# Patient Record
Sex: MALE | Race: WHITE | URBAN - METROPOLITAN AREA
[De-identification: names, ages, dates, MRNs, and addresses within clinical notes are randomized per-mention and may not be internally consistent; named-entity substitution may affect disease eponyms.]

---
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
## 2021-07-06 ENCOUNTER — NON-APPOINTMENT (OUTPATIENT)
Age: 21
End: 2021-07-06

## 2021-07-06 PROBLEM — Z00.00 ENCOUNTER FOR PREVENTIVE HEALTH EXAMINATION: Status: ACTIVE | Noted: 2021-07-06

## 2021-07-23 ENCOUNTER — APPOINTMENT (OUTPATIENT)
Dept: UROLOGY | Facility: CLINIC | Age: 21
End: 2021-07-23

## 2021-08-08 ENCOUNTER — EMERGENCY (EMERGENCY)
Facility: HOSPITAL | Age: 21
LOS: 0 days | Discharge: HOME | End: 2021-08-08
Attending: EMERGENCY MEDICINE | Admitting: EMERGENCY MEDICINE
Payer: COMMERCIAL

## 2021-08-08 VITALS
SYSTOLIC BLOOD PRESSURE: 140 MMHG | RESPIRATION RATE: 18 BRPM | HEART RATE: 74 BPM | OXYGEN SATURATION: 100 % | TEMPERATURE: 97 F | DIASTOLIC BLOOD PRESSURE: 80 MMHG

## 2021-08-08 VITALS
HEART RATE: 86 BPM | SYSTOLIC BLOOD PRESSURE: 123 MMHG | OXYGEN SATURATION: 98 % | TEMPERATURE: 97 F | DIASTOLIC BLOOD PRESSURE: 66 MMHG | RESPIRATION RATE: 18 BRPM

## 2021-08-08 DIAGNOSIS — N48.30 PRIAPISM, UNSPECIFIED: ICD-10-CM

## 2021-08-08 LAB — SARS-COV-2 RNA SPEC QL NAA+PROBE: SIGNIFICANT CHANGE UP

## 2021-08-08 PROCEDURE — 99284 EMERGENCY DEPT VISIT MOD MDM: CPT | Mod: 25

## 2021-08-08 PROCEDURE — 54220 IRRG CRPRA CAVRNOSA PRIAPISM: CPT

## 2021-08-08 RX ORDER — PHENYLEPHRINE HYDROCHLORIDE 10 MG/ML
50 INJECTION INTRAVENOUS ONCE
Refills: 0 | Status: COMPLETED | OUTPATIENT
Start: 2021-08-08 | End: 2021-08-08

## 2021-08-08 RX ORDER — MORPHINE SULFATE 50 MG/1
4 CAPSULE, EXTENDED RELEASE ORAL ONCE
Refills: 0 | Status: DISCONTINUED | OUTPATIENT
Start: 2021-08-08 | End: 2021-08-08

## 2021-08-08 RX ORDER — PHENYLEPHRINE HYDROCHLORIDE 10 MG/ML
3 INJECTION INTRAVENOUS ONCE
Refills: 0 | Status: DISCONTINUED | OUTPATIENT
Start: 2021-08-08 | End: 2021-08-08

## 2021-08-08 RX ORDER — PHENYLEPHRINE HYDROCHLORIDE 10 MG/ML
0.05 INJECTION INTRAVENOUS ONCE
Refills: 0 | Status: DISCONTINUED | OUTPATIENT
Start: 2021-08-08 | End: 2021-08-08

## 2021-08-08 RX ORDER — SODIUM CHLORIDE 9 MG/ML
1000 INJECTION, SOLUTION INTRAVENOUS ONCE
Refills: 0 | Status: COMPLETED | OUTPATIENT
Start: 2021-08-08 | End: 2021-08-08

## 2021-08-08 RX ADMIN — MORPHINE SULFATE 4 MILLIGRAM(S): 50 CAPSULE, EXTENDED RELEASE ORAL at 12:11

## 2021-08-08 RX ADMIN — Medication 0.5 MILLIGRAM(S): at 11:40

## 2021-08-08 RX ADMIN — SODIUM CHLORIDE 1000 MILLILITER(S): 9 INJECTION, SOLUTION INTRAVENOUS at 14:38

## 2021-08-08 RX ADMIN — PHENYLEPHRINE HYDROCHLORIDE 50 MICROGRAM(S): 10 INJECTION INTRAVENOUS at 13:39

## 2021-08-08 NOTE — ED PROVIDER NOTE - CARE PROVIDERS DIRECT ADDRESSES
[email or web address]

## 2021-08-08 NOTE — ED PROVIDER NOTE - NS ED ROS FT
Cardiac:  No chest pain, SOB or edema. No chest pain with exertion.  Respiratory:  No cough or respiratory distress. No hemoptysis. No history of asthma or RAD.  GI:  No nausea, vomiting, diarrhea or abdominal pain.  :  + priapism No dysuria, frequency or burning.  MS:  No myalgia, muscle weakness, joint pain or back pain.  Neuro:  No headache or weakness.  No LOC.  Skin:  No skin rash.   Endocrine: No history of thyroid disease or diabetes.  Except as documented in the HPI,  all other systems are negative.

## 2021-08-08 NOTE — ED PROVIDER NOTE - PROGRESS NOTE DETAILS
pt with priapism, terbutaline given subcutaneously with no relief. 25 CC ASPIRATED with minimal relief. 1 dose of Phenylephrine administered with no resolution, will re-administer complete resolution after second dose of phenyleprine, will dc with urology f/u

## 2021-08-08 NOTE — ED PROVIDER NOTE - CARE PROVIDER_API CALL
Lux Cortez)  Urology  [address] [place] [address], [address]  [place] [address]  Phone: [phone]  Fax: [phone]  Follow Up Time:

## 2021-08-08 NOTE — ED PROVIDER NOTE - ATTENDING CONTRIBUTION TO CARE
pt with low flow priapism eventually detumesced after aspiration 25cc dark blood and injection of 150mcg total phenylephrine (after sc terbutaline unsuccessful), pt comfortable and voiding freely, will d/c to continue heme and  workup. Patient counseled regarding conditions which should prompt return.

## 2021-08-08 NOTE — ED ADULT NURSE NOTE - CHPI ED NUR SYMPTOMS POS
priapism since 8am, states this event also occurred june 30th of this year. pain woke him from sleep today at 8:00am

## 2021-08-08 NOTE — PHARMACY COMMUNICATION NOTE - COMMENTS
as per dr Camp wants 50mcg dose for priapism
dr Camp wants terbutaline 0.5mg SQ for priapism as first line

## 2021-08-08 NOTE — ED PROVIDER NOTE - OBJECTIVE STATEMENT
Pt is  a 22y/o male with a pmhx of Recurrent Priapism of unknown etiology presents today for priapism x 3 hours with no preceding factors. Pt sts similar episode occurred a few months ago lasting for 8 hours and requiring drainage. Pt denies abd pain, fever, chills, CP, SOB, Cocaine use.

## 2021-08-08 NOTE — CONSULT NOTE ADULT - SUBJECTIVE AND OBJECTIVE BOX
23M w/no PMHx presents to ED with complaints of painful erection.  patient reports he took mushrooms last night and this am when he awoke he had an erection.  He reports the erection continued to get more firm as time passed and it became painful.  He came to the ED for evaluation.  Patient reports 2 previous episodes of this that happened over the past 2 years.  The first time it resolved spontaneously the second it was drained in the ED.  The patient also reports that each morning this week he awoke with an erection that lasted 1 hour and resolved  He is currently following with Heme, Dr. Ortez and Urology, Dr Lou in [place].  No CP/SOB.  No fever/.chills.  No abdominal complaints.        PAST MEDICAL & SURGICAL HISTORY:  No pertinent past medical history    No significant past surgical history        MEDICATIONS  (STANDING):  None      Allergies    No Known Allergies    Intolerances        Physical Exam:  General: appears anxious  Abdominal: soft, ND/NT  : Erect penis noted.  No overlying skin changes  Extremities:  no clubbing/cyanosis/edema  Neuro: A/O x 3    Vital Signs Last 24 Hrs  T(C): 36.1 (08 Aug 2021 11:06), Max: 36.3 (08 Aug 2021 10:48)  T(F): 97 (08 Aug 2021 11:06), Max: 97.3 (08 Aug 2021 10:48)  HR: 54 (08 Aug 2021 11:06) (54 - 74)  BP: 155/86 (08 Aug 2021 11:06) (140/80 - 155/86)  BP(mean): --  RR: 18 (08 Aug 2021 11:06) (18 - 18)  SpO2: 100% (08 Aug 2021 11:06) (100% - 100%)    LABS:      None        CAPILLARY BLOOD GLUCOSE            Cultures:      RADIOLOGY & ADDITIONAL STUDIES: None

## 2021-08-08 NOTE — ED PROVIDER NOTE - PHYSICAL EXAMINATION
VITAL SIGNS: I have reviewed nursing notes and confirm.  CONSTITUTIONAL: Well-developed; well-nourished; in no acute distress.   SKIN: skin exam is warm and dry, no acute rash.    HEAD: Normocephalic; atraumatic.  EYES:  conjunctiva and sclera clear.  ENT: No nasal discharge; airway clear.  CARD: S1, S2 normal; no murmurs, gallops, or rubs. Regular rate and rhythm.   RESP: No wheezes, rales or rhonchi.  ABD: Normal bowel sounds; soft; non-distended; non-tender  PELVIC: erect penis, no TTP Testicles   EXT: Normal ROM.  No clubbing, cyanosis or edema.   NEURO: Alert, oriented, grossly unremarkable

## 2021-08-08 NOTE — ED ADULT TRIAGE NOTE - CHIEF COMPLAINT QUOTE
c/o priapism since 8 am  Pt took "mushrooms" last night. This has happened to the pt before and has had it drained in the past

## 2021-08-08 NOTE — ED PROVIDER NOTE - NSFOLLOWUPINSTRUCTIONS_ED_ALL_ED_FT
Priapism  Priapism is an unwanted erection of the penis that usually develops without sexual stimulation or desire. Priapism affects males of all ages. There are three types of priapism:  Recurrent acute priapism. With this type, erections are painful and last less than 3 hours. The erections come and go.  Acute prolonged priapism. With this type, erections are painful and last hours to days. This type can lead to erectile dysfunction.  Persistent priapism. With this type, erections are usually painless and can last weeks to years. The penis gets erect but not rigid. This type can lead to erectile dysfunction.  What are the causes?  This condition develops either when blood has difficulty leaving the penis (low-flow priapism) or if too much blood flows into the penis (high-flow priapism). Blood flow issues may be caused by:  Erectile dysfunction medicine. This is the most common cause.  Medicine that is used to treat depression and anxiety.  Blood problems that are common in people who have sickle cell disease or leukemia.  Use of illegal drugs, such as cocaine and marijuana.  Excessive alcohol use.  Neurological problems, such as multiple sclerosis.  Diabetes mellitus.  An injury to the penis.  An infection.  In some cases, the cause may not be known.    What are the signs or symptoms?  Symptoms of this condition include:  A prolonged erection.  A painful erection.  How is this diagnosed?  This condition is diagnosed with a physical exam. Blood tests may be done to help identify the cause of the condition.    How is this treated?  Treatment for this condition depends on the cause and the type of priapism. Recurrent acute priapism is often managed at home. Acute prolonged priapism is usually treated at a hospital, where treatment may involve:  Getting fluid and medicines for pain through an IV tube.  A blood transfusion.  A procedure to drain blood from the penis.  Surgery to make a passageway for blood to flow in the penis (surgical shunting).  No standard treatment exists for persistent priapism.    Follow these instructions at home:  Managing Recurrent Priapism     Try taking a warm bath or exercising.  Keep track of how long your erection lasts. If it does not go away in 3 hours, seek medical care.  General instructions     Avoid sexual stimulation and intercourse until your health care provider says that they are okay for you.  Avoid drugs or alcohol if they caused the priapism. Avoiding them can help to prevent the condition from coming back.  Drink enough fluid to keep your urine clear or pale yellow.  Empty your bladder as much as possible.  Take over-the-counter and prescription medicines only as told by your health care provider.  Do not take any medicines during an episode unless you get approval from your health care provider.  Keep all follow-up visits as told by your health care provider. This is important.  Contact a health care provider if:  Your pain gets worse.  Your pain does not improve with treatment.  You have recurrent priapism and your erection does not go away in 3 hours.  Get help right away if:  You have a fever or chills.  You have pain, swelling, or redness in your genitals or your groin area.

## 2021-08-08 NOTE — ED PROVIDER NOTE - PATIENT PORTAL LINK FT
You can access the FollowMyHealth Patient Portal offered by [facility] by registering at the following website: [email or web address]. By joining [employer]’s FollowMyHealth portal, you will also be able to view your health information using other applications (apps) compatible with our system.

## 2021-08-08 NOTE — CONSULT NOTE ADULT - ASSESSMENT
23M w/complaints of priapism (recurrent)  - recommended intracavernosal injection of phenylephrine +/- aspiration  - recommend continued follow up with Heme and Urology outpatient  - if unsuccessful, please recall Urology for further recommendations  - d/w Urology attending

## 2021-08-28 ENCOUNTER — EMERGENCY (EMERGENCY)
Facility: HOSPITAL | Age: 21
LOS: 0 days | Discharge: HOME | End: 2021-08-28
Attending: EMERGENCY MEDICINE | Admitting: EMERGENCY MEDICINE
Payer: COMMERCIAL

## 2021-08-28 VITALS
TEMPERATURE: 98 F | OXYGEN SATURATION: 98 % | RESPIRATION RATE: 18 BRPM | DIASTOLIC BLOOD PRESSURE: 62 MMHG | SYSTOLIC BLOOD PRESSURE: 123 MMHG | HEART RATE: 96 BPM

## 2021-08-28 VITALS
WEIGHT: 160.06 LBS | HEART RATE: 91 BPM | RESPIRATION RATE: 18 BRPM | SYSTOLIC BLOOD PRESSURE: 125 MMHG | DIASTOLIC BLOOD PRESSURE: 66 MMHG | TEMPERATURE: 98 F | HEIGHT: 66 IN | OXYGEN SATURATION: 97 %

## 2021-08-28 DIAGNOSIS — N48.30 PRIAPISM, UNSPECIFIED: ICD-10-CM

## 2021-08-28 LAB
BASE EXCESS BLDV CALC-SCNC: -19.1 MMOL/L — LOW (ref -2–3)
CA-I SERPL-SCNC: 1.13 MMOL/L — LOW (ref 1.15–1.33)
GAS PNL BLDV: 131 MMOL/L — LOW (ref 136–145)
GAS PNL BLDV: SIGNIFICANT CHANGE UP
HCO3 BLDV-SCNC: 19 MMOL/L — LOW (ref 22–29)
HCT VFR BLDA CALC: 53 % — HIGH (ref 39–51)
HGB BLD CALC-MCNC: 17.7 G/DL — HIGH (ref 12.6–17.4)
LACTATE BLDV-MCNC: 17.6 MMOL/L — CRITICAL HIGH (ref 0.5–2)
PCO2 BLDV: 119 MMHG — HIGH (ref 42–55)
PH BLDV: 6.81 — LOW (ref 7.32–7.43)
PO2 BLDV: 14 MMHG — SIGNIFICANT CHANGE UP
POTASSIUM BLDV-SCNC: 4.6 MMOL/L — SIGNIFICANT CHANGE UP (ref 3.5–5.1)
SAO2 % BLDV: 6.3 % — SIGNIFICANT CHANGE UP

## 2021-08-28 PROCEDURE — 99285 EMERGENCY DEPT VISIT HI MDM: CPT | Mod: 25

## 2021-08-28 PROCEDURE — 54220 IRRG CRPRA CAVRNOSA PRIAPISM: CPT

## 2021-08-28 RX ORDER — MORPHINE SULFATE 50 MG/1
4 CAPSULE, EXTENDED RELEASE ORAL ONCE
Refills: 0 | Status: DISCONTINUED | OUTPATIENT
Start: 2021-08-28 | End: 2021-08-28

## 2021-08-28 RX ORDER — PHENYLEPHRINE HYDROCHLORIDE 10 MG/ML
50 INJECTION INTRAVENOUS ONCE
Refills: 0 | Status: COMPLETED | OUTPATIENT
Start: 2021-08-28 | End: 2021-08-28

## 2021-08-28 RX ORDER — TADALAFIL 10 MG/1
0 TABLET, FILM COATED ORAL
Qty: 0 | Refills: 0 | DISCHARGE

## 2021-08-28 RX ADMIN — Medication 0.5 MILLIGRAM(S): at 14:29

## 2021-08-28 RX ADMIN — MORPHINE SULFATE 4 MILLIGRAM(S): 50 CAPSULE, EXTENDED RELEASE ORAL at 14:00

## 2021-08-28 RX ADMIN — PHENYLEPHRINE HYDROCHLORIDE 50 MICROGRAM(S): 10 INJECTION INTRAVENOUS at 14:29

## 2021-08-28 NOTE — ED PROVIDER NOTE - ATTENDING CONTRIBUTION TO CARE
I was present for and supervised the key and critical aspects of the procedures performed during the care of the patient. patient presents for evaluation of priaprism he has had multiple similar episodes in the past he sees urology as an outpatient takes low dose po cialis.  notes erection over the past several hours presents for worsening pain he denies any testicular pain he denies any fevers or chills   on exam patient has no abdominal pain no pain to palpation of the b/l testicles patient does have priaprism initially given ice and morphine with no relief we consulted urology and performed drainage with phenylephrine which was successful I will discharge at this time

## 2021-08-28 NOTE — ED ADULT TRIAGE NOTE - CHIEF COMPLAINT QUOTE
"I have priaprism. This happens often. It's the third time I'm coming to the hospital for it." Pt advises he was prescribed a low dose Cialis for this condition one week ago by a Urologist in [place].

## 2021-08-28 NOTE — ED PROVIDER NOTE - PATIENT PORTAL LINK FT
You can access the FollowMyHealth Patient Portal offered by [facility] by registering at the following website: [email or web address]. By joining [employer]’s FollowMyHealth portal, you will also be able to view your health information using other applications (apps) compatible with our system.

## 2021-08-28 NOTE — ED PROVIDER NOTE - NSFOLLOWUPINSTRUCTIONS_ED_ALL_ED_FT
Priapism    Priapism is an unwanted erection of the penis that usually develops without sexual stimulation or desire. Priapism affects males of all ages. There are three types of priapism:  •Recurrent acute priapism. With this type, erections are painful and last less than 3 hours. The erections come and go.      •Acute prolonged priapism. With this type, erections are painful and last hours to days. This type can lead to erectile dysfunction.      •Persistent priapism. With this type, erections are usually painless and can last weeks to years. The penis gets erect but not rigid. This type can lead to erectile dysfunction.        What are the causes?  This condition develops either when blood has difficulty leaving the penis (low-flow priapism) or when too much blood flows into the penis (high-flow priapism). Blood flow issues may be caused by:  •Erectile dysfunction medicine. This is the most common cause.      •Medicine that is used to treat depression and anxiety.      •Blood problems that are common in people who have sickle cell disease or leukemia.      •Use of illegal drugs, such as cocaine and crack.      •Use of marijuana.      •Excessive alcohol use.      •Diabetes mellitus.      •Neurological problems, such as multiple sclerosis.      •An injury to the penis.      •An infection.      In some cases, the cause may not be known.      What are the signs or symptoms?  Symptoms of this condition include:  •A prolonged erection in the absence of stimulation.      •A painful erection.        How is this diagnosed?    This condition is diagnosed with a physical exam. Blood tests and imaging tests such as an ultrasound or MRI may be done to help identify the cause of the condition.      How is this treated?  Treatment for this condition depends on the cause and the type of priapism. Recurrent acute priapism is often managed at home. Acute prolonged priapism is usually treated at a hospital, where treatment may involve:  •Getting fluid and medicines for pain through an IV.      •Having a procedure to drain blood from the penis.      •Having surgery to make a passageway for blood to flow in the penis (surgical shunting).      •Having a blood transfusion if you have sickle cell disease.      No standard treatment exists for persistent priapism. Ice packs and cold showers are sometimes used to help relieve symptoms.      Follow these instructions at home:      Managing recurrent priapism                   •Try taking a warm bath or exercising.      •Keep track of how long your erection lasts. If it does not go away in 4 hours, seek medical care.      Medicines     •Take over-the-counter and prescription medicines only as told by your health care provider.      • Do not take any medicines during an episode unless you get approval from your health care provider.        General instructions      •Avoid sexual stimulation and intercourse until your health care provider says that they are okay for you.      •Avoid drugs or alcohol if they caused the priapism. Avoiding them can help to prevent the condition from coming back.      •Drink enough fluid to keep your urine pale yellow.      •Empty your bladder as much as possible.      •Keep all follow-up visits as told by your health care provider. This is important.        Contact a health care provider if:    •Your pain gets worse.      •Your pain does not improve with treatment.      •You have recurrent priapism and your erection does not go away in 4 hours.        Get help right away if:    •You have a fever or chills.      •You have more pain, swelling, or redness in your genitals or your groin area.        Summary    •Priapism is an unwanted erection of the penis that usually develops without sexual stimulation or desire.      •Treatment for this condition depends on the cause and the type of priapism. Recurrent acute priapism is often managed at home. Acute prolonged priapism is usually treated at a hospital.      •Avoid drugs or alcohol if they caused the priapism. This can help to prevent the condition from coming back.      •Take over-the-counter and prescription medicines only as told by your health care provider.      •Contact a health care provider if you have recurrent priapism and your erection does not go away in 4 hours.      This information is not intended to replace advice given to you by your health care provider. Make sure you discuss any questions you have with your health care provider.

## 2021-08-28 NOTE — ED PROVIDER NOTE - OBJECTIVE STATEMENT
21 windy, [facility] of priaprisim, followed by private urologist in [place] on low dose Cialis per uro, presents to ed for painful erection sine 9am this morning, mild, aching, no radiation. denies fever, chills, dysuria, hematuria, testicular pain.

## 2021-08-28 NOTE — CONSULT NOTE ADULT - ASSESSMENT
Patient is a 22yo M with a pmhx of priapsim whom presents to ED with c/o erection since 9am today and began having pain so came to ED. Patient reports 2 prior ER visits for the same problem requiring phenylephrine injection into penis. Patient currently being followed by a urologist in [place] recommenced to have an MRI of spine. Patient prescribed cialis every other day Patient seen by hematology as well. Patient had aspiration of penis and currently receiving phenylephrine injections every 5 minutes. Patient reports sx now improving, pain has resolved.   -c/w phenylephrine injection and once detumescence achieved observe in ED for one hour  -patient should follow up with his private urologist  -d/w DR De Souza

## 2021-08-28 NOTE — ED PROVIDER NOTE - PHYSICAL EXAMINATION
Physical Exam    Vital Signs: I have reviewed the initial vital signs.  Constitutional: well-nourished, appears stated age, no acute distress  Eyes: Conjunctiva pink, Sclera clear  Cardiovascular: S1 and S2, regular rate, regular rhythm, well-perfused extremities, radial pulses equal and 2+  Respiratory: unlabored respiratory effort, clear to auscultation bilaterally no wheezing, rales and rhonchi  Gastrointestinal: soft, non-tender abdomen, no pulsatile mass, normal bowl sounds  : erect penis, no testicular pain, circumcised   Musculoskeletal: supple neck, no lower extremity edema, no midline tenderness  Integumentary: warm, dry, no rash  Neurologic: awake, alert, nvi

## 2021-08-28 NOTE — CONSULT NOTE ADULT - SUBJECTIVE AND OBJECTIVE BOX
HPI:  Patient is a 20yo M with a pmhx of priapsim whom presents to ED with c/o erection since 9am today and began having pain so came to ED. Patient reports 2 prior ER visits for the same problem requiring phenylephrine injection into penis. Patient currently being followed by a urologist in [place] recommenced to have an MRI of spine. Patient prescribed cialis every other day Patient seen by hematology as well.       PAST MEDICAL & SURGICAL HISTORY:  No pertinent past medical history    Priapism    No significant past surgical history        Allergies    No Known Allergies    I      MEDICATIONS  (STANDING):  cialis     ROS:  General:	no fever, weight loss,  chills  Skin: no rash, ulcers  Ophthalmologic: no visual changes  ENMT:	no sore throat  Respiratory and Thorax: no cough, wheeze,  sob  Cardiovascular:	no chest pain, palpitations, dizziness  Gastrointestinal:	no nausea, vomiting, diarrhea, abd pain  Genitourinary:	no dysuria, hematuria  Musculoskeletal:	no joint pains  Neurological:	 no speech disturbance, focal weakness, numbness  Psychiatric:	no depression, anxiety, psychosis  Hematology/Lymphatics:	no anemia  Endocrine:	no polyuria, polydipsia        Vital Signs Last 24 Hrs  T(C): 36.4 (28 Aug 2021 13:43), Max: 36.4 (28 Aug 2021 13:43)  T(F): 97.5 (28 Aug 2021 13:43), Max: 97.5 (28 Aug 2021 13:43)  HR: 91 (28 Aug 2021 13:43) (91 - 91)  BP: 125/66 (28 Aug 2021 13:43) (125/66 - 125/66)  BP(mean): --  RR: 18 (28 Aug 2021 13:43) (18 - 18)  SpO2: 97% (28 Aug 2021 13:43) (97% - 97%)    PHYSICAL EXAM:      Constitutional: A&Ox4  Respiratory: cta b/l  Cardiovascular: s1 s2 rrr  Gastrointestinal: soft nt  nd + bs no rebound or guarding  Genitourinary: no cva tenderness, penile erection improving with phenylephrine injections now semi soft/much less erect  Extremities: normal rom, no edema, calf tenderness  Neurological:no focal deficits  Skin: no rash

## 2021-08-28 NOTE — ED PROVIDER NOTE - NS ED ROS FT
Constitutional: (-) fever, (-) chills  Eyes: (-) visual changes  ENT: (-) nasal congestions  Cardiovascular: (-) chest pain, (-) syncope  Respiratory: (-) cough, (-) shortness of breath, (-) dyspnea,   Gastrointestinal: (-) vomiting, (-) diarrhea, (-)nausea,  Musculoskeletal: (-) neck pain, (-) back pain, (-) joint pain,  Integumentary: (-) rash, (-) edema, (-) bruises  Neurological: (-) headache, (-) loc, (-) dizziness, (-) tingling, (-)numbness,  Peripheral Vascular: (-) leg swelling  :  (-)dysuria,  (-) hematuria, (+) painful erection   Allergic/Immunologic: (-) pruritus

## 2021-08-28 NOTE — ED PROVIDER NOTE - PROVIDER TOKENS
PROVIDER:[TOKEN:[[id number]],FOLLOWUP:[1-3 Days]],FREE:[LAST:[your urologist],PHONE:[(   )    -],FAX:[(   )    -],FOLLOWUP:[1-3 Days]]

## 2021-08-28 NOTE — ED PROVIDER NOTE - CARE PROVIDER_API CALL
River De Souza)  Urology  [address] [place] [address]  Phone: [phone]  Fax: [phone]  Follow Up Time: 1-3 Days    your urologist,   Phone: (   )    -  Fax: (   )    -  Follow Up Time: 1-3 Days

## 2021-08-28 NOTE — ED PROVIDER NOTE - PROGRESS NOTE DETAILS
uro aware of pt and gas result, will give tubuterline and inject phenylephrine priapism resolved s/p 5ml of phenylephrine injection over 5 mins per cc injection. Will observe for 1 hour. Pain resolved. pt without sxs, will miguel w private uro, cleared from our uro here. pt without sxs, will fu w private uro, cleared from our uro here. full sensation throughout.

## 2021-08-28 NOTE — ED PROVIDER NOTE - CHILD ABUSE FACILITY
EMERGENCY DEPARTMENT ENCOUNTER      NAME: Blossom Hale  AGE: 35 year old female  YOB: 1985  MRN: [id number]  EVALUATION DATE & TIME: 7/14/2021  8:51 PM    PCP: Andrea Leach    ED PROVIDER: Cody Do M.D.      Chief Complaint   Patient presents with     Shingles     dx 7/13, sxs started 07/10ish         FINAL IMPRESSION:  1. Herpes zoster ophthalmicus of left eye          ED COURSE & MEDICAL DECISION MAKING:    Pertinent Labs & Imaging studies reviewed. (See chart for details)  35 year old female presents to the Emergency Department for evaluation of left-sided facial swelling and pain.  Patient seen by primary and by optometry/ophthalmology diagnosed with zoster ophthalmicus.  No evidence of involvement of deeper structures of  Eye.  Patient has not had any change in vision or increase in eye pain.  Rather, she presents with complaint of worsening swelling to the left side of her face and pain to the left side of her face.  Examination is consistent with left-sided preauricular, postauricular, anterior cervical lymphadenopathy.  Fluorescein exam is again without dendritic lesion to the left eye.  Patient not had progression of her ocular symptoms.  Patient is already taking ophthalmic antibiotic and steroid.  Patient is already taking otic antibiotic and steroid.  I did give the patient a dose of IM antibiotic in the emergency department.  I gave her a dose of IM Toradol in the emergency department for pain and inflammation.  I counseled the patient on appropriate use of her home pain medication to control her symptoms.  I referred the patient back to ophthalmology to consider systemic steroids.  She is due to follow-up with them on Friday as well. Strict return precautions and anticipatory guidance given.              9:27 PM I met with the patient, performed my physical exam, and discussed plan of care.  9:36 PM I marked the patient for discharge.    At the conclusion of the  encounter I discussed the results of all of the tests and the disposition. The questions were answered. The patient or family acknowledged understanding and was agreeable with the care plan.       0 minutes of critical care time     MEDICATIONS GIVEN IN THE EMERGENCY:  Medications   fluorescein (FUL-HANNAH) ophthalmic strip STRP 600 mcg (600 mcg Both Eyes Given by Other 7/14/21 2154)   tetracaine (PONTOCAINE) 0.5 % ophthalmic solution 1-2 drop (2 drops Both Eyes Given by Other 7/14/21 2154)   dexamethasone (DECADRON) injection 10 mg (10 mg Intramuscular Given 7/14/21 2151)   ketorolac (TORADOL) injection 30 mg (30 mg Intramuscular Given 7/14/21 2152)       NEW PRESCRIPTIONS STARTED AT TODAY'S ER VISIT  Discharge Medication List as of 7/14/2021 10:00 PM             =================================================================    HPI    Patient information was obtained from: Patient    Use of Intrepreter: N/A       Blossom Hale is a 35 year old female with a pertinent history of shingles and PCOS who presents to this ED by walk in for evaluation of shingles.    Per chart review, patient was seen by the opthamologist at [facility] on 7/13/21 for shingles that had spread to her left eye. Exam showed herpes zoster conjunctivitis, herpes zoster eye disease, and herpes zoster with ophthalmic complication of the left eye. Patient was to continue Zoster treatment, prescribed artificial tears and Tobradex TID with instructions to call or return to clinic if symptoms worsen.    Patient reports with rash around left eye originating from the left scalp. The rash began 3 days ago at her scalp and spread down to her left eye. She was seen at the eye clinic yesterday and prescribed medication. She also reports facial swelling and neck pain on the left side. She also endorses migraines and light sensitivity. She denies any other complaints at this time.       REVIEW OF SYSTEMS   Review of  Systems   Constitutional: Negative for chills and fever.   HENT: Positive for ear pain and facial swelling. Negative for ear discharge and mouth sores.         Positive for rash on left face (scalp to eye).   Eyes: Positive for photophobia, pain, discharge and redness. Negative for itching and visual disturbance.   Musculoskeletal: Positive for neck pain.   Neurological: Negative for dizziness, tremors, seizures, syncope, facial asymmetry, speech difficulty, weakness, light-headedness, numbness and headaches.   All other systems reviewed and are negative.       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, all functioning     Miscarriage 12/08, 9/2011     Moderate major depression (H) 11/19/2009     Obesity      Renal stones     x2     S/P colonoscopy 11/09    diarrhea       PAST SURGICAL HISTORY:  Past Surgical History:   Procedure Laterality Date     CHOLECYSTECTOMY       COLONOSCOPY       COLONOSCOPY N/A 5/23/2018    Procedure: COLONOSCOPY;;  Surgeon: Bert Faria MD;  Location:  GI     ESOPHAGOSCOPY, GASTROSCOPY, DUODENOSCOPY (EGD), COMBINED N/A 5/23/2018    Procedure: COMBINED ESOPHAGOSCOPY, GASTROSCOPY, DUODENOSCOPY (EGD);  ESOPHAGOSCOPY, GASTROSCOPY, DUODENOSCOPY (EGD) -and Colonoscopy-Room 252;  Surgeon: Bert Faria MD;  Location:  GI     HERNIA REPAIR       SURGICAL HISTORY OF -   21 yo    left inguinal hernia repair           CURRENT MEDICATIONS:    acetaminophen (TYLENOL) 325 MG tablet  albuterol (PROAIR HFA/PROVENTIL HFA/VENTOLIN HFA) 108 (90 Base) MCG/ACT inhaler  BIOTIN PO  Calcium Carbonate (CALCIUM 600 PO)  ergocalciferol (ERGOCALCIFEROL) 66921 UNITS capsule  fluticasone (FLONASE) 50 MCG/ACT nasal spray  fluticasone-salmeterol (ADVAIR DISKUS) 250-50 MCG/DOSE inhaler  levonorgestrel (MIRENA) 20  MCG/24HR IUD  liraglutide (VICTOZA) 18 MG/3ML soln  metFORMIN (GLUCOPHAGE-XR) 500 MG 24 hr tablet  propranolol ER (INDERAL LA) 60 MG 24 hr capsule  TOPIRAMATE PO        ALLERGIES:  Allergies   Allergen Reactions     Dust Mite Extract      Latex      Levaquin Swelling     Morphine Hives       FAMILY HISTORY:  Family History   Problem Relation Age of Onset     Gastrointestinal Disease Mother         Crohns Disease     Myocardial Infarction Maternal Grandfather 70        2 times -? age, at ag eof 77     Cardiovascular Paternal Grandfather         chf      Diabetes Paternal Grandfather      Neurologic Disorder Paternal Grandfather         amyloid tumor     Cardiovascular Maternal Grandmother         chf      Cancer Father         Lung Cancer,smoker      Family History Negative Sister         1     Family History Negative Brother         1     Breast Cancer Other         cousins, mother's side of family -dx in 30     Cancer - colorectal No family hx of      Colon Cancer No family hx of        SOCIAL HISTORY:   Social History     Socioeconomic History     Marital status:      Spouse name: Keenan     Number of children: Not on file     Years of education: Not on file     Highest education level: Not on file   Occupational History     Occupation: [employer]     Occupation: [employer]     Employer: [employer] [address]   Tobacco Use     Smoking status: Former Smoker     Types: Cigarettes     Smokeless tobacco: Never Used     Tobacco comment: quit in    Substance and Sexual Activity     Alcohol use: Yes     Alcohol/week: 0.0 standard drinks     Comment: few times yearly     Drug use: No     Sexual activity: Yes     Partners: Male     Comment: trying to get pregnant   Other Topics Concern     Parent/sibling w/ CABG, MI or angioplasty before 65F 55M? No      Service No     Blood Transfusions No     Caffeine Concern Yes     Comment: one cup daily     Occupational Exposure Not Asked     Hobby Hazards Not  "Asked     Sleep Concern Not Asked     Stress Concern Not Asked     Weight Concern Not Asked     Special Diet Yes     Comment: high protein     Back Care Not Asked     Exercise Yes     Comment: walking 5-6 miles daily, yoga, weight lifting and taebo 2 days per week     Bike Helmet Not Asked     Seat Belt Yes     Self-Exams Not Asked   Social History Narrative     since 2009    Working parttime, at [employer], and is a   no kids    3 pets      Social Determinants of Health     Financial Resource Strain:      Difficulty of Paying Living Expenses:    Food Insecurity:      Worried About Running Out of Food in the Last Year:      Ran Out of Food in the Last Year:    Transportation Needs:      Lack of Transportation (Medical):      Lack of Transportation (Non-Medical):    Physical Activity:      Days of Exercise per Week:      Minutes of Exercise per Session:    Stress:      Feeling of Stress :    Social Connections:      Frequency of Communication with Friends and Family:      Frequency of Social Gatherings with Friends and Family:      Attends Druze Services:      Active Member of Clubs or Organizations:      Attends Club or Organization Meetings:      Marital Status:    Intimate Partner Violence:      Fear of Current or Ex-Partner:      Emotionally Abused:      Physically Abused:      Sexually Abused:        VITALS:  Patient Vitals for the past 24 hrs:   BP Temp Temp src Pulse Resp SpO2 Height Weight   07/14/21 2049 -- -- -- -- -- -- 1.575 m (5' 2\") 106.6 kg (235 lb)   07/14/21 2047 134/68 98.5  F (36.9  C) Oral 101 16 97 % -- --       PHYSICAL EXAM    General: A&O x 3 no apparent distress  HEENT: PERRL, EOMI, moist mucous membranes  Neck: Supple, no rigidity  Cardiovascular: 2+ distal pulses, cap refill less than 2 seconds. RRR No m/r/g  Pulmonary: Chest nontender, symmetrical rise, normal effort, no respiratory distress. Clear to auscultation bilaterally.  Abdomen: Nontender, no distention. " No rebound, guarding, or rigidity.  Extremities: No clubbing, cyanosis, or edema  Musculoskeletal: Gait normal; extremities atraumatic x4  Neuro: Cranial nerves II through XII intact, GCS 15; intact, symmetric strength and sensation x4 extremities  Skin: No rash, jaundice, pallor.  Warm dry and intact  Psych: Normal mood and affect  Eye: Vesicles, erythema to left cranial nerve V distribution.  No fluorescein uptake on left eye examination.  Conjunctival injection sparing limbus.       LAB:  All pertinent labs reviewed and interpreted.       RADIOLOGY:  Reviewed all pertinent imaging. Please see official radiology report.  No orders to display             IAndres, am serving as a scribe to document services personally performed by Dr. Cody Do based on my observation and the provider's statements to me. ICody MD attest that Andres Roth is acting in a scribe capacity, has observed my performance of the services and has documented them in accordance with my direction.  Any spelling or grammatic inconsistencies or inaccuracies are typographical or dictation errors.    Cody Do M.D.  Emergency Medicine  [facility] EMERGENCY ROOM  [address] [facility] [id number] [phone]  Dept: [phone]     Cody Do MD  07/15/21 0148     [facility]

## 2021-08-28 NOTE — ED PROVIDER NOTE - CLINICAL SUMMARY MEDICAL DECISION MAKING FREE TEXT BOX
patient presents for evaluation of priaprism, we have attempted drainage which was successful after phenylephrine injection we have consulted urology service of dr daniel who evaluated patient in the ED presents for procedure which was successful advised to observe patient for an hour and discharge if asymptomatic advised patient to follow up with his urologist in the next 24 hours

## 2021-08-28 NOTE — ED ADULT NURSE NOTE - CHIEF COMPLAINT QUOTE
"I have priaprism. This happens often. It's the third time I'm coming to the hospital for it." Pt advises he was prescribed a low dose Cialis for this condition one week ago by a Urologist in [place].

## 2021-08-31 ENCOUNTER — EMERGENCY (EMERGENCY)
Facility: HOSPITAL | Age: 21
LOS: 0 days | Discharge: HOME | End: 2021-08-31
Attending: EMERGENCY MEDICINE | Admitting: EMERGENCY MEDICINE
Payer: COMMERCIAL

## 2021-08-31 VITALS
HEART RATE: 77 BPM | SYSTOLIC BLOOD PRESSURE: 118 MMHG | OXYGEN SATURATION: 100 % | TEMPERATURE: 98 F | DIASTOLIC BLOOD PRESSURE: 58 MMHG | RESPIRATION RATE: 18 BRPM

## 2021-08-31 VITALS
SYSTOLIC BLOOD PRESSURE: 141 MMHG | HEIGHT: 66 IN | RESPIRATION RATE: 18 BRPM | OXYGEN SATURATION: 100 % | DIASTOLIC BLOOD PRESSURE: 90 MMHG | HEART RATE: 69 BPM | WEIGHT: 160.06 LBS | TEMPERATURE: 96 F

## 2021-08-31 DIAGNOSIS — N48.30 PRIAPISM, UNSPECIFIED: ICD-10-CM

## 2021-08-31 PROCEDURE — 54220 IRRG CRPRA CAVRNOSA PRIAPISM: CPT

## 2021-08-31 PROCEDURE — 99285 EMERGENCY DEPT VISIT HI MDM: CPT | Mod: 25

## 2021-08-31 RX ORDER — SODIUM CHLORIDE 9 MG/ML
1000 INJECTION, SOLUTION INTRAVENOUS ONCE
Refills: 0 | Status: COMPLETED | OUTPATIENT
Start: 2021-08-31 | End: 2021-08-31

## 2021-08-31 RX ORDER — MORPHINE SULFATE 50 MG/1
4 CAPSULE, EXTENDED RELEASE ORAL ONCE
Refills: 0 | Status: DISCONTINUED | OUTPATIENT
Start: 2021-08-31 | End: 2021-08-31

## 2021-08-31 RX ORDER — PHENYLEPHRINE HYDROCHLORIDE 10 MG/ML
100 INJECTION INTRAVENOUS ONCE
Refills: 0 | Status: COMPLETED | OUTPATIENT
Start: 2021-08-31 | End: 2021-08-31

## 2021-08-31 RX ADMIN — PHENYLEPHRINE HYDROCHLORIDE 100 MICROGRAM(S): 10 INJECTION INTRAVENOUS at 11:16

## 2021-08-31 RX ADMIN — Medication 0.5 MILLIGRAM(S): at 10:09

## 2021-08-31 RX ADMIN — MORPHINE SULFATE 4 MILLIGRAM(S): 50 CAPSULE, EXTENDED RELEASE ORAL at 10:09

## 2021-08-31 RX ADMIN — MORPHINE SULFATE 4 MILLIGRAM(S): 50 CAPSULE, EXTENDED RELEASE ORAL at 10:58

## 2021-08-31 RX ADMIN — SODIUM CHLORIDE 1000 MILLILITER(S): 9 INJECTION, SOLUTION INTRAVENOUS at 10:09

## 2021-08-31 NOTE — ED PROVIDER NOTE - CLINICAL SUMMARY MEDICAL DECISION MAKING FREE TEXT BOX
priapism resolved after treatment voiding freely will d/c to pursue further eval. Patient counseled regarding conditions which should prompt return.

## 2021-08-31 NOTE — ED ADULT NURSE NOTE - CHIEF COMPLAINT QUOTE
c/o penile erection since 7am. pt was seen on Sunday for same problem. Pt returned from [facility] on Friday

## 2021-08-31 NOTE — ED PROVIDER NOTE - PROGRESS NOTE DETAILS
pt with minimal improvement after terbutaline/ice packs, will blcok and aspirate CP: Penile block initiated (1% lidocaine) with some relief CP: 15 cc blood aspirated. 250 micrograms total phenylephrine injected. Patient feels relief. Will observe.

## 2021-08-31 NOTE — ED PROVIDER NOTE - NS ED ROS FT
GEN:  no fever, no chills, no generalized weakness  NEURO:  no headache, no dizziness  CV:  no chest pain  RESP:  no sob  GI:  no nausea, no vomiting, no abdominal pain, no diarrhea  :  +priapism and pain, no dysuria, no urinary frequency, no hematuria  MSK:  no joint pain, no edema  SKIN:  no rash, no bruising

## 2021-08-31 NOTE — ED PROVIDER NOTE - OBJECTIVE STATEMENT
20 yo male, PMHx of priapism presents with priapism since 7 am, not alleviated with Cialis (prescribed for priapism by urologist at [facility]) taken 15 minutes after onset, no aggravating factors, associated with localized throbbing penile pain. Denies fever, chills, dysuria, hematuria, testicular pain.

## 2021-08-31 NOTE — ED PROCEDURE NOTE - CPROC ED TIME OUT STATEMENT1
“Patient's name, , procedure and correct site were confirmed during the [place] Timeout.”
“Patient's name, , procedure and correct site were confirmed during the [place] Timeout.”

## 2021-08-31 NOTE — ED PROVIDER NOTE - ATTENDING CONTRIBUTION TO CARE
pt known to me with low flow priapism returns with same, has been on cialis initially every other day now daily, applied ice packs and climbed stairs with no relief pta,

## 2021-08-31 NOTE — ED ADULT TRIAGE NOTE - CHIEF COMPLAINT QUOTE
c/o penile erection since 7am. pt was seen on Sunday for same problem. Pt returned from [facility] on Friday

## 2021-08-31 NOTE — ED PROVIDER NOTE - NSFOLLOWUPINSTRUCTIONS_ED_ALL_ED_FT
Priapism    WHAT YOU NEED TO KNOW:    What is priapism? Priapism is when you have an unwanted ongoing erection. The erection occurs without sexual stimulation. Your penis may be dark red or purplish. Priapism is usually painful and can lead to permanent tissue damage.    What are the types of priapism?   •Low-flow priapism occurs when the veins in your penis become blocked and blood cannot flow out. It is also called ischemic priapism. Your erection is present for 4 hours or longer. You may also have a painful erection that happens over and over for many hours. Low-flow priapism is an emergency and must be treated immediately.      •High-flow priapism is when too much blood flows into your penis. You have an erection for 4 hours or more but it is normally not painful. This is also called nonischemic priapism.       What increases my risk for priapism?   •Medical conditions, such as sickle cell disease, penile tumors, or leukemia       •Medicines, such as blood thinners, and those that treat erectile dysfunction, high blood pressure, or mental health conditions      •Trauma, such as a groin, back, or spinal cord injury, or an injury during sex      •Drugs, such as cocaine and marijuana      •Alcohol in large amounts      What tests may I need?   •Blood tests may be done to check the oxygen level in your penis. Blood tests may also be done to check for a condition that can lead to priapism.      •Urine tests may be done to test for medicines or drugs.       •An ultrasound may be done to check the blood flow in your penis.       How is priapism treated? Treatment will depend on the type of priapism you have. High-flow priapism often goes away on its own. You may need any of the following:   •Medicine may be given to decrease pain and swelling or to regulate your hormone levels. You may also need an injection in your penis to help improve blood flow.      •Ice packs applied to your groin may help decrease blood flow to your penis and relieve your erection.      •Aspiration is done to remove blood from your penis. Your penis is numbed and blood is removed with a needle. Your healthcare provider may also flush the blood vessels with saline.      •Surgery may be done to place a shunt in your penis. The shunt allows blood to pass through and out of your penis. Surgery may also be done to block blood flow to your penis for a short time.       How can I prevent priapism?   •Manage medical conditions that increase your risk for priapism. Take your medicines as directed.      •Do not drink alcohol if it caused your priapism before.      •Do not use drugs. Talk with your healthcare provider if you use drugs and need help to stop.      When should I contact my healthcare provider?   •You have any questions or concerns about your condition or care.          When should I seek immediate care?   •You have an erection for more than 4 hours.      •You have a painful erection that happens over and over.      CARE AGREEMENT:    You have the right to help plan your care. Learn about your health condition and how it may be treated. Discuss treatment options with your healthcare providers to decide what care you want to receive. You always have the right to refuse treatment.

## 2021-08-31 NOTE — ED PROVIDER NOTE - PATIENT PORTAL LINK FT
You can access the FollowMyHealth Patient Portal offered by [facility] by registering at the following website: [email or web address]. By joining [employer]’s FollowMyHealth portal, you will also be able to view your health information using other applications (apps) compatible with our system.

## 2021-08-31 NOTE — ED PROVIDER NOTE - CARE PROVIDER_API CALL
Deion Santa)  Urology  [address] [place] [address], [address]  [place] [address]  Phone: [phone]  Fax: [phone]  Follow Up Time: 1-3 Days

## 2021-09-09 ENCOUNTER — EMERGENCY (EMERGENCY)
Facility: HOSPITAL | Age: 21
LOS: 0 days | Discharge: HOME | End: 2021-09-09
Attending: EMERGENCY MEDICINE | Admitting: EMERGENCY MEDICINE
Payer: COMMERCIAL

## 2021-09-09 VITALS
HEART RATE: 80 BPM | OXYGEN SATURATION: 99 % | DIASTOLIC BLOOD PRESSURE: 64 MMHG | RESPIRATION RATE: 18 BRPM | SYSTOLIC BLOOD PRESSURE: 134 MMHG

## 2021-09-09 VITALS
HEART RATE: 80 BPM | RESPIRATION RATE: 18 BRPM | OXYGEN SATURATION: 99 % | TEMPERATURE: 97 F | SYSTOLIC BLOOD PRESSURE: 126 MMHG | HEIGHT: 66 IN | DIASTOLIC BLOOD PRESSURE: 84 MMHG | WEIGHT: 164.91 LBS

## 2021-09-09 DIAGNOSIS — N48.89 OTHER SPECIFIED DISORDERS OF PENIS: ICD-10-CM

## 2021-09-09 DIAGNOSIS — N48.30 PRIAPISM, UNSPECIFIED: ICD-10-CM

## 2021-09-09 PROCEDURE — 99284 EMERGENCY DEPT VISIT MOD MDM: CPT

## 2021-09-09 RX ORDER — PHENYLEPHRINE HYDROCHLORIDE 10 MG/ML
100 INJECTION INTRAVENOUS ONCE
Refills: 0 | Status: COMPLETED | OUTPATIENT
Start: 2021-09-09 | End: 2021-09-09

## 2021-09-09 RX ORDER — PHENYLEPHRINE HYDROCHLORIDE 10 MG/ML
100 INJECTION INTRAVENOUS ONCE
Refills: 0 | Status: DISCONTINUED | OUTPATIENT
Start: 2021-09-09 | End: 2021-09-09

## 2021-09-09 RX ADMIN — Medication 0.5 MILLIGRAM(S): at 08:47

## 2021-09-09 RX ADMIN — PHENYLEPHRINE HYDROCHLORIDE 100 MICROGRAM(S): 10 INJECTION INTRAVENOUS at 09:40

## 2021-09-09 NOTE — ED PROVIDER NOTE - ATTENDING CONTRIBUTION TO CARE
pt well known to me with recurrent priapism presents with same, detumesced after phenylephrine, voiding freely, will d/c

## 2021-09-09 NOTE — ED PROVIDER NOTE - CLINICAL SUMMARY MEDICAL DECISION MAKING FREE TEXT BOX
December 20, 2019  11:30 a.m.-11:53 a.m.    Chief complaint:  Depression, anxiety, irritability, ADHD and fatigue    History of present illness:  Sertraline 100 mg q.a.m..  Decreased Wellbutrin XL to 300 mg q.a.m..  Risperdal 1.5 mg Q HS.  With the decrease of Wellbutrin he noticed that there has been some decrease of dizziness and tinnitus.  There been no changes in mood for the most part.  Generally had been doing quite well.  Lately the last couple weeks a relationship that he he is an got a little bit more complicated and is feeling more stressed.  He is having some trouble sleeping.  He is not depressed.  No crying spells.  No thoughts of death or suicide.  His irritability is manageable and in general sort away over the years he gets angry much less.  Energy and motivation are “not bad”.  Concentration memory are “a little bit of a struggle.”  He enjoys things as interest in things.    PSH:  He sees his therapist regularly and goes to a lot of AA an essay meetings.    Review of systems:  No problems with balance.  Dizziness and tinnitus have decreased.  No dry mouth or constipation.  No headache nausea diarrhea or tremor.    Exam:  Alert and oriented x3.  Grooming and hygiene are normal.  Gait stable.  Speech normal.  No tremor or oral facial movements.  Mood is euthymic.  Affect is appropriate to content.  Thought processes are logical and coherent.  Associations are intact.  No thoughts of self-harm.  Attention concentration are normal.  Recent remote memory are normal.  Judgment is intact.  Insight is adequate.    Impression/plan:  Stressed but stable.  Coping adequately.  Continue current medications.  I had been thinking about decreasing his Risperdal to 1 mg at this session but I am going to wait because of problems in his relationship right now.  He will return in 3 months.    Diagnosis:  Dysthymic disorder.  ADHD combined type.  Insomnia.  [id number]   pt well known to me with recurrent priapism presents with same, detumesced after phenylephrine, voiding freely, will d/c

## 2021-09-09 NOTE — ED PROVIDER NOTE - PHYSICAL EXAMINATION
CONSTITUTIONAL: Well-developed; well-nourished; in no acute distress.   SKIN: warm, dry  HEAD: Normocephalic; atraumatic.  EYES: no conjunctival injection  ENT: No nasal discharge  NECK: Supple  CARD: S1, S2 normal; no murmurs, gallops, or rubs. Regular rate and rhythm.   RESP: No wheezes, rales or rhonchi.  ABD: soft ntnd. BS+ in all 4 quadrants.  : +priapism with tenderness. no testicular tenderness  NEURO: Alert, oriented, grossly unremarkable.  PSYCH: Cooperative, appropriate.
Jhoan Brasher)

## 2021-09-09 NOTE — ED PROVIDER NOTE - OBJECTIVE STATEMENT
20 yo male, PMHx of priapism presents with priapism since 5:50 am, not alleviated with Cialis (prescribed for priapism by urologist at [facility]) taken 15 minutes after onset, no aggravating factors, associated with localized throbbing penile pain. He is scheduled for an MRI tomorrow through his urologist. Denies fever, chills, dysuria, hematuria, testicular pain.

## 2021-09-09 NOTE — ED PROVIDER NOTE - PATIENT PORTAL LINK FT
You can access the FollowMyHealth Patient Portal offered by [facility] by registering at the following website: [email or web address]. By joining [employer]’s FollowMyHealth portal, you will also be able to view your health information using other applications (apps) compatible with our system.

## 2021-09-10 ENCOUNTER — APPOINTMENT (OUTPATIENT)
Dept: UROLOGY | Facility: CLINIC | Age: 21
End: 2021-09-10

## 2021-11-19 NOTE — ED PROVIDER NOTE - CROS ED NEURO ALL NEG
Bladder scan done at this time showing >999ml in bladder. Straight cath preformed and 950ml output was obtained. - - -

## 2022-02-08 NOTE — ED ADULT NURSE NOTE - NSFALLRSKOUTCOME_ED_ALL_ED
Time-based billing (NON-critical care)
Time-based billing (NON-critical care)
Universal Safety Interventions

## 2022-12-14 NOTE — ED ADULT NURSE NOTE - NS ED PATIENT SAFETY CONCERN
No Finasteride Counseling:  I discussed with the patient the risks of use of finasteride including but not limited to decreased libido, decreased ejaculate volume, gynecomastia, and depression. Women should not handle medication.  All of the patient's questions and concerns were addressed. Finasteride Male Counseling: Finasteride Counseling:  I discussed with the patient the risks of use of finasteride including but not limited to decreased libido, decreased ejaculate volume, gynecomastia, and depression. Women should not handle medication.  All of the patient's questions and concerns were addressed.

## 2023-01-17 NOTE — ED ADULT TRIAGE NOTE - WEIGHT IN LBS
139.9 Nsaids Counseling: NSAID Counseling: I discussed with the patient that NSAIDs should be taken with food. Prolonged use of NSAIDs can result in the development of stomach ulcers.  Patient advised to stop taking NSAIDs if abdominal pain occurs.  The patient verbalized understanding of the proper use and possible adverse effects of NSAIDs.  All of the patient's questions and concerns were addressed.

## 2023-10-29 ENCOUNTER — EMERGENCY (EMERGENCY)
Facility: HOSPITAL | Age: 23
LOS: 0 days | Discharge: HOME | End: 2023-10-29
Attending: EMERGENCY MEDICINE
Payer: COMMERCIAL

## 2023-10-29 VITALS
HEART RATE: 78 BPM | OXYGEN SATURATION: 100 % | SYSTOLIC BLOOD PRESSURE: 121 MMHG | RESPIRATION RATE: 16 BRPM | DIASTOLIC BLOOD PRESSURE: 70 MMHG

## 2023-10-29 VITALS
WEIGHT: 154.98 LBS | HEART RATE: 85 BPM | SYSTOLIC BLOOD PRESSURE: 144 MMHG | DIASTOLIC BLOOD PRESSURE: 81 MMHG | RESPIRATION RATE: 18 BRPM | TEMPERATURE: 97 F | OXYGEN SATURATION: 100 % | HEIGHT: 65 IN

## 2023-10-29 DIAGNOSIS — N48.30 PRIAPISM, UNSPECIFIED: ICD-10-CM

## 2023-10-29 DIAGNOSIS — N48.89 OTHER SPECIFIED DISORDERS OF PENIS: ICD-10-CM

## 2023-10-29 PROCEDURE — 54220 IRRG CRPRA CAVRNOSA PRIAPISM: CPT

## 2023-10-29 PROCEDURE — 99283 EMERGENCY DEPT VISIT LOW MDM: CPT | Mod: 25

## 2023-10-29 PROCEDURE — 99283 EMERGENCY DEPT VISIT LOW MDM: CPT

## 2023-10-29 RX ORDER — PSEUDOEPHEDRINE HCL 30 MG
60 TABLET ORAL ONCE
Refills: 0 | Status: COMPLETED | OUTPATIENT
Start: 2023-10-29 | End: 2023-10-29

## 2023-10-29 RX ADMIN — Medication 60 MILLIGRAM(S): at 20:41

## 2023-10-29 NOTE — ED PROVIDER NOTE - PHYSICAL EXAMINATION
CONST: Well appearing in NAD  SKIN: Warm, dry, no acute rashes. Good turgor  : CONST: Well appearing in NAD  SKIN: Warm, dry, no acute rashes. Good turgor  : erect penis CONST: Well appearing in NAD  SKIN: Warm, dry, no acute rashes. Good turgor  : erect penis (chaperone PA Abdulaziz) CONST: Well appearing in NAD  SKIN: Warm, dry, no acute rashes. Good turgor  : erect penis (chaperone PA Abdulaziz), no testicular ttp.

## 2023-10-29 NOTE — ED PROCEDURE NOTE - PROCEDURE ADDITIONAL DETAILS
penile block performed with 2.5cc of lidocaine w/o epi bilateral dorsum aspect of penis. aspirated 15cc on each side of the penis. injected 1cc phenylephrine on each side. patient tolerated procedure well.

## 2023-10-29 NOTE — ED PROVIDER NOTE - CARE PROVIDER_API CALL
follow up with your primary doctor,   Phone: (   )    -  Fax: (   )    -  Follow Up Time: 4-6 Days    follow up with your urologist,   Phone: (   )    -  Fax: (   )    -  Follow Up Time: 4-6 Days   follow up with your primary doctor,   Phone: (   )    -  Fax: (   )    -  Follow Up Time: 4-6 Days    follow up with your urologist,   Phone: (   )    -  Fax: (   )    -  Follow Up Time: 4-6 Days    Leonel Bassett  Urology  [address] [place] [id number]  Phone: [phone]  Fax: [phone]  Follow Up Time: 4-6 Days

## 2023-10-29 NOTE — ED PROVIDER NOTE - ATTENDING APP SHARED VISIT CONTRIBUTION OF CARE
23-year-old male, history of priapism, snorted cocaine last night, presents to the ED with painful penile erection since 8 AM.  No trauma.  Exam shows soft nontender abdomen, testicles nontender, engorged firm penis.

## 2023-10-29 NOTE — ED PROVIDER NOTE - NS ED ATTENDING STATEMENT MOD
This was a shared visit with the MEKHI. I reviewed and verified the documentation and independently performed the documented:

## 2023-10-29 NOTE — ED PROVIDER NOTE - PATIENT PORTAL LINK FT
You can access the FollowMyHealth Patient Portal offered by [facility] by registering at the following website: [email or web address]. By joining [facility]’s FollowMyHealth portal, you will also be able to view your health information using other applications (apps) compatible with our system.

## 2023-10-29 NOTE — ED PROVIDER NOTE - PROVIDER TOKENS
FREE:[LAST:[follow up with your primary doctor],PHONE:[(   )    -],FAX:[(   )    -],FOLLOWUP:[4-6 Days]],FREE:[LAST:[follow up with your urologist],PHONE:[(   )    -],FAX:[(   )    -],FOLLOWUP:[4-6 Days]] FREE:[LAST:[follow up with your primary doctor],PHONE:[(   )    -],FAX:[(   )    -],FOLLOWUP:[4-6 Days]],FREE:[LAST:[follow up with your urologist],PHONE:[(   )    -],FAX:[(   )    -],FOLLOWUP:[4-6 Days]],PROVIDER:[TOKEN:[[id number]],FOLLOWUP:[4-6 Days]]

## 2023-10-29 NOTE — ED PROVIDER NOTE - PROGRESS NOTE DETAILS
Priapism resolved. sxs that should prompt return explained to pt, verbalizes understanding.       I was directly involved in the care of this patient. PA Fellow Claudia note/plan reviewed and agreed.

## 2023-10-29 NOTE — ED PROVIDER NOTE - CLINICAL SUMMARY MEDICAL DECISION MAKING FREE TEXT BOX
23-year-old male, history of priapism, snorted cocaine last night, presents with persistent penile erection since 8 AM.  15 cc of blood aspirated on each corpus cavernosum.  Also given 1 cc of phenylephrine on each corpus.  Symptoms improved.  Will DC and refer to urology.

## 2023-10-29 NOTE — ED PROCEDURE NOTE - NS ED ATTENDING STATEMENT MOD
This was a shared visit with the MEKHI. I reviewed and verified the documentation and independently performed the documented:
This was a shared visit with the MEKHI. I reviewed and verified the documentation and independently performed the documented:

## 2023-10-29 NOTE — ED PROVIDER NOTE - OBJECTIVE STATEMENT
patient is a 22yo male complaining of erection that will not go away. pt has a history of priapism in the past. pt states that he woke up 8am with erection, has been persistent all day. pt attributes this episode to doing cocaine last night. has seen urology in the past, was prescribed tadalafil.

## 2024-03-14 NOTE — ED ADULT NURSE NOTE - EXTENSIONS OF SELF_ADULT
[No Rash or Lesion] : No rash or lesion [Alert] : alert [Oriented to Person] : oriented to person [Oriented to Place] : oriented to place English [Oriented to Time] : oriented to time [Calm] : calm [de-identified] : A/Ox3; NAD. appears comfortable [de-identified] : EOMI; sclera anicteric. [de-identified] : no cervical lymphadenopathy [de-identified] : airway patent, no use of accessory muscles [de-identified] : abd is obese, soft NT ND [de-identified] : +ROM, normal gait  None

## 2024-10-08 NOTE — ED PROCEDURE NOTE - PROCEDURE SUPERVISED BY
-- DO NOT REPLY / DO NOT REPLY ALL --  -- This inbox is not monitored. If this was sent to the wrong provider or department, reroute message to P [facility] Reroute pool. --  -- Message is from [facility] Operations ([facility]) --    Order Request  Lab: yearly    Message / reason: full panel, please call pt    Insurance type:   Payor: [employer] / Plan: CHOICE PLUS/2400 / Product Type: PPO MISC    Preferred Delivery Method  Input in Epic     Caller Information       Contact Date/Time Type Contact Phone/Fax    10/08/2024 09:10 AM CDT Phone (Incoming) Paulina Nguyen (Self) [phone] (M)            Alternative phone number:     Can a detailed message be left? Yes - Voicemail   Patient has been advised the message will be addressed within 2-3 business days.       Dr. Cleary

## 2025-01-29 ENCOUNTER — APPOINTMENT (OUTPATIENT)
Dept: OTOLARYNGOLOGY | Facility: CLINIC | Age: 25
End: 2025-01-29
Payer: COMMERCIAL

## 2025-01-29 DIAGNOSIS — K11.20 SIALOADENITIS, UNSPECIFIED: ICD-10-CM

## 2025-01-29 PROCEDURE — 99203 OFFICE O/P NEW LOW 30 MIN: CPT

## 2025-01-30 PROBLEM — K11.20 SIALOADENITIS: Status: ACTIVE | Noted: 2025-01-30
